# Patient Record
Sex: MALE | Race: WHITE | NOT HISPANIC OR LATINO | Employment: FULL TIME | URBAN - METROPOLITAN AREA
[De-identification: names, ages, dates, MRNs, and addresses within clinical notes are randomized per-mention and may not be internally consistent; named-entity substitution may affect disease eponyms.]

---

## 2020-06-25 ENCOUNTER — OFFICE VISIT (OUTPATIENT)
Dept: URGENT CARE | Facility: CLINIC | Age: 60
End: 2020-06-25
Payer: COMMERCIAL

## 2020-06-25 VITALS
OXYGEN SATURATION: 99 % | BODY MASS INDEX: 25.05 KG/M2 | HEIGHT: 70 IN | HEART RATE: 65 BPM | WEIGHT: 175 LBS | RESPIRATION RATE: 18 BRPM | TEMPERATURE: 98.3 F

## 2020-06-25 DIAGNOSIS — B34.9 VIRAL SYNDROME: Primary | ICD-10-CM

## 2020-06-25 PROCEDURE — 99203 OFFICE O/P NEW LOW 30 MIN: CPT | Performed by: PHYSICIAN ASSISTANT

## 2020-06-25 PROCEDURE — U0003 INFECTIOUS AGENT DETECTION BY NUCLEIC ACID (DNA OR RNA); SEVERE ACUTE RESPIRATORY SYNDROME CORONAVIRUS 2 (SARS-COV-2) (CORONAVIRUS DISEASE [COVID-19]), AMPLIFIED PROBE TECHNIQUE, MAKING USE OF HIGH THROUGHPUT TECHNOLOGIES AS DESCRIBED BY CMS-2020-01-R: HCPCS | Performed by: PHYSICIAN ASSISTANT

## 2020-06-26 ENCOUNTER — TELEPHONE (OUTPATIENT)
Dept: URGENT CARE | Facility: CLINIC | Age: 60
End: 2020-06-26

## 2020-06-27 LAB — SARS-COV-2 RNA SPEC QL NAA+PROBE: NOT DETECTED

## 2020-06-28 ENCOUNTER — TELEPHONE (OUTPATIENT)
Dept: URGENT CARE | Facility: MEDICAL CENTER | Age: 60
End: 2020-06-28

## 2021-07-13 ENCOUNTER — TELEPHONE (OUTPATIENT)
Dept: UROLOGY | Facility: MEDICAL CENTER | Age: 61
End: 2021-07-13

## 2021-07-13 NOTE — TELEPHONE ENCOUNTER
What is the reason for the patients appointment? Peggy Dyson from [address] [place] [address] [address] Internal Medicine calling for appointment for patient for right kidney atrophy  PCP will be faxing records over to [phone]    Do we accept the patient's insurance or is the patient Self-Pay? [employer]       Has the patient had any previous urologist(s)? no      Have patient records been requested? yes        Has the patient had any outside testing done?     No   What is the patients location preference for an office visit? [place]       Does the patient have a personal history of cancer?     No

## 2021-07-15 NOTE — TELEPHONE ENCOUNTER
Patient called stating he would like to know if records were received to make appointment   Patient is closer to [facility] but will come to [facility] if appointment available

## 2021-07-19 ENCOUNTER — OFFICE VISIT (OUTPATIENT)
Dept: UROLOGY | Facility: CLINIC | Age: 61
End: 2021-07-19
Payer: COMMERCIAL

## 2021-07-19 VITALS
WEIGHT: 182.98 LBS | DIASTOLIC BLOOD PRESSURE: 70 MMHG | HEIGHT: 70 IN | BODY MASS INDEX: 26.2 KG/M2 | SYSTOLIC BLOOD PRESSURE: 122 MMHG

## 2021-07-19 DIAGNOSIS — R97.20 RISING PSA LEVEL: ICD-10-CM

## 2021-07-19 DIAGNOSIS — Z80.42 FAMILY HISTORY OF PROSTATE CANCER: ICD-10-CM

## 2021-07-19 DIAGNOSIS — N20.0 RENAL CALCULUS: ICD-10-CM

## 2021-07-19 DIAGNOSIS — R10.9 RIGHT FLANK PAIN: Primary | ICD-10-CM

## 2021-07-19 DIAGNOSIS — N26.1 ATROPHIC KIDNEY: ICD-10-CM

## 2021-07-19 PROCEDURE — 99204 OFFICE O/P NEW MOD 45 MIN: CPT | Performed by: UROLOGY

## 2021-07-19 NOTE — PROGRESS NOTES
[address] 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  One renal ultrasound reports says he may have a 7 mm stone in the right kidney  This was described as nonobstructing  We decided upon getting a CT scan without contrast and I will send him the results  Right now there is no evidence of anything malignant and any further testing or therapy will be based purely upon his symptoms right now  Strong family history of prostate cancer, PSA up to 1 54  He will abstain from any sexual activity for 3 days prior to his next PSA and we will recheck this in 3 months and I will see him with that result  Chief Complaint     Chief Complaint   Patient presents with    Nephrolithiasis       History of Present Illness    new patient office visit:  70-year-old man sent in consultation for atrophic right kidney by Dr Nia Hough  He had a renal ultrasound done 2021 which showed marked right renal atrophy of the right kidney measuring 5 6 cm in maximal length  There was right pelviectasis as well as dilatation of the visualized portion of proximal aspect of the right ureter  PVR was 81 cc  Bladder looked normal   This was done for history of right renal atrophy and flank pain  The pain he describes has been going on for about 6 months on and off and is primarily a pinching sensation in his right upper quadrant right under the rib and then a dull ache in the right flank    He notices that he feels the sensations more the morning if PSA have a bowel movement and then the sensation decreases after he has the bowel movement  He has some urinary frequency and he has nocturia 1 time a night  No difficulty voiding  He was last seen by Dr Mil Leal U[facility] for urologic follow-up given that he had a strong family history of prostate cancer in 2 brothers, chronic prostatitis and right flank pain due to known atrophic kidney  PSA was 1 54 as of 5/24/2021  The prostatitis seem to be aggravated by bicycling  He may have had sexual activity within 3 days of his last PSA and this is a rise in his PSA from below 1 0  The following portions of the patient's history were reviewed and updated as appropriate: allergies, current medications, past family history, past medical history, past social history, past surgical history and problem list   Past Medical History:   Diagnosis Date    Known health problems: none      Past Surgical History:   Procedure Laterality Date    HERNIA REPAIR       shoulder  Review of Systems   Review of Systems   Constitutional: Negative for fever  Respiratory: Negative for shortness of breath  Cardiovascular: Negative for chest pain  Gastrointestinal: Negative  Genitourinary: Positive for frequency  Negative for difficulty urinating and hematuria  Active Problem List   There is no problem list on file for this patient  Objective   /70   Ht 5' 10" (1 778 m)   Wt 83 kg (182 lb 15 7 oz)   BMI 26 26 kg/m²     Physical Exam  Constitutional:       Appearance: Normal appearance  He is normal weight  HENT:      Head: Normocephalic and atraumatic  Eyes:      Extraocular Movements: Extraocular movements intact  Pulmonary:      Effort: Pulmonary effort is normal    Abdominal:      General: Abdomen is flat  There is no distension  Palpations: Abdomen is soft  There is no mass  Tenderness: There is no abdominal tenderness  There is no right CVA tenderness, left CVA tenderness, guarding or rebound  Hernia: No hernia is present  Genitourinary:     Penis: Normal        Testes: Normal       Prostate: Normal       Rectum: Normal       Comments: Normal circumcised phallus, testicles are descended bilaterally without lesions or nodules  Rectal exam reveals normal tone, no masses prostate about 30 g, smooth symmetric benign  No nodules  Musculoskeletal:         General: Normal range of motion  Cervical back: Normal range of motion  Skin:     Coloration: Skin is not jaundiced or pale  Neurological:      General: No focal deficit present  Mental Status: He is alert and oriented to person, place, and time  Psychiatric:         Mood and Affect: Mood normal          Behavior: Behavior normal          Thought Content: Thought content normal          Judgment: Judgment normal              Current Medications   No current outpatient medications on file          Sammie Osei MD Bladder tumor  01/30/2018    Active  Jayesh Harrison

## 2021-07-19 NOTE — LETTER
2021     Kezia Charles MD  [address] [facility] [address]    Patient: Hernan Dickinson   YOB: 1960   Date of Visit: 2021       Dear Dr Nicole Nesbitt: Thank you for referring Hernan Dickinson to me for evaluation  Below are my notes for this consultation  If you have questions, please do not hesitate to call me  I look forward to following your patient along with you  Sincerely,        Ivanna Maria MD        CC: No Recipients  Ivanna Maria MD  2021  3:23 PM  Sign when Signing Visit  [address] [facility]  [address], [address] [place] [phone]  www  [facility]  org      NAME: Hernan Dickinson  AGE: 62 y o  SEX: male  : 1960   MRN: [id number]    DATE: 2021  TIME: 3:16 PM    Assessment and Plan:    Right flank pain and right upper quadrant pain, associated with an atrophic kidney which is chronic 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  One renal ultrasound reports says he may have a 7 mm stone in the right kidney  This was described as nonobstructing  We decided upon getting a CT scan without contrast and I will send him the results  Right now there is no evidence of anything malignant and any further testing or therapy will be based purely upon his symptoms right now  Strong family history of prostate cancer, PSA up to 1 54  He will abstain from any sexual activity for 3 days prior to his next PSA and we will recheck this in 3 months and I will see him with that result  Chief Complaint     Chief Complaint   Patient presents with    Nephrolithiasis       History of Present Illness    new patient office visit:  46-year-old man sent in consultation for atrophic right kidney by Dr Nicole Nesbitt  He had a renal ultrasound done 2021 which showed marked right renal atrophy of the right kidney measuring 5 6 cm in maximal length    There was right pelviectasis as well as dilatation of the visualized portion of proximal aspect of the right ureter  PVR was 81 cc  Bladder looked normal   This was done for history of right renal atrophy and flank pain  The pain he describes has been going on for about 6 months on and off and is primarily a pinching sensation in his right upper quadrant right under the rib and then a dull ache in the right flank  He notices that he feels the sensations more the morning if PSA have a bowel movement and then the sensation decreases after he has the bowel movement  He has some urinary frequency and he has nocturia 1 time a night  No difficulty voiding  He was last seen by Dr Taiwo Messer Urology for urologic follow-up given that he had a strong family history of prostate cancer in 2 brothers, chronic prostatitis and right flank pain due to known atrophic kidney  PSA was 1 54 as of 5/24/2021  The prostatitis seem to be aggravated by bicycling  He may have had sexual activity within 3 days of his last PSA and this is a rise in his PSA from below 1 0  The following portions of the patient's history were reviewed and updated as appropriate: allergies, current medications, past family history, past medical history, past social history, past surgical history and problem list   Past Medical History:   Diagnosis Date    Known health problems: none      Past Surgical History:   Procedure Laterality Date    HERNIA REPAIR       shoulder  Review of Systems   Review of Systems   Constitutional: Negative for fever  Respiratory: Negative for shortness of breath  Cardiovascular: Negative for chest pain  Gastrointestinal: Negative  Genitourinary: Positive for frequency  Negative for difficulty urinating and hematuria  Active Problem List   There is no problem list on file for this patient        Objective   /70   Ht 5' 10" (1 778 m)   Wt 83 kg (182 lb 15 7 oz)   BMI 26 26 kg/m²     Physical Exam  Constitutional:       Appearance: Normal appearance  He is normal weight  HENT:      Head: Normocephalic and atraumatic  Eyes:      Extraocular Movements: Extraocular movements intact  Pulmonary:      Effort: Pulmonary effort is normal    Abdominal:      General: Abdomen is flat  There is no distension  Palpations: Abdomen is soft  There is no mass  Tenderness: There is no abdominal tenderness  There is no right CVA tenderness, left CVA tenderness, guarding or rebound  Hernia: No hernia is present  Genitourinary:     Penis: Normal        Testes: Normal       Prostate: Normal       Rectum: Normal       Comments: Normal circumcised phallus, testicles are descended bilaterally without lesions or nodules  Rectal exam reveals normal tone, no masses prostate about 30 g, smooth symmetric benign  No nodules  Musculoskeletal:         General: Normal range of motion  Cervical back: Normal range of motion  Skin:     Coloration: Skin is not jaundiced or pale  Neurological:      General: No focal deficit present  Mental Status: He is alert and oriented to person, place, and time  Psychiatric:         Mood and Affect: Mood normal          Behavior: Behavior normal          Thought Content: Thought content normal          Judgment: Judgment normal              Current Medications   No current outpatient medications on file          Be Gomez MD

## 2021-07-23 ENCOUNTER — HOSPITAL ENCOUNTER (OUTPATIENT)
Dept: CT IMAGING | Facility: HOSPITAL | Age: 61
Discharge: HOME | End: 2021-07-23
Attending: UROLOGY
Payer: COMMERCIAL

## 2021-07-23 DIAGNOSIS — N26.1 ATROPHIC KIDNEY: ICD-10-CM

## 2021-07-23 DIAGNOSIS — R10.9 RIGHT FLANK PAIN: ICD-10-CM

## 2021-07-23 DIAGNOSIS — N20.0 RENAL CALCULUS: ICD-10-CM

## 2021-07-23 PROCEDURE — 74176 CT ABD & PELVIS W/O CONTRAST: CPT

## 2021-07-23 PROCEDURE — G1004 CDSM NDSC: HCPCS

## 2021-10-14 ENCOUNTER — TELEPHONE (OUTPATIENT)
Dept: UROLOGY | Facility: CLINIC | Age: 61
End: 2021-10-14

## 2021-10-26 ENCOUNTER — NURSE TRIAGE (OUTPATIENT)
Dept: OTHER | Facility: OTHER | Age: 61
End: 2021-10-26

## 2021-10-26 DIAGNOSIS — Z20.822 ENCOUNTER FOR SCREENING LABORATORY TESTING FOR COVID-19 VIRUS: Primary | ICD-10-CM

## 2021-10-26 PROCEDURE — U0003 INFECTIOUS AGENT DETECTION BY NUCLEIC ACID (DNA OR RNA); SEVERE ACUTE RESPIRATORY SYNDROME CORONAVIRUS 2 (SARS-COV-2) (CORONAVIRUS DISEASE [COVID-19]), AMPLIFIED PROBE TECHNIQUE, MAKING USE OF HIGH THROUGHPUT TECHNOLOGIES AS DESCRIBED BY CMS-2020-01-R: HCPCS | Performed by: FAMILY MEDICINE

## 2021-10-26 PROCEDURE — U0005 INFEC AGEN DETEC AMPLI PROBE: HCPCS | Performed by: FAMILY MEDICINE

## 2022-03-22 ENCOUNTER — TELEPHONE (OUTPATIENT)
Dept: UROLOGY | Facility: CLINIC | Age: 62
End: 2022-03-22

## 2022-03-22 NOTE — TELEPHONE ENCOUNTER
----- Message from Awilda Andres MD sent at 3/22/2022 12:09 PM EDT -----  Please let him know that his PSA dropped to 0 68  This is very good

## 2022-05-09 VITALS
HEART RATE: 59 BPM | HEIGHT: 70 IN | SYSTOLIC BLOOD PRESSURE: 110 MMHG | WEIGHT: 187 LBS | DIASTOLIC BLOOD PRESSURE: 66 MMHG | TEMPERATURE: 96.4 F | BODY MASS INDEX: 26.77 KG/M2

## 2022-05-09 DIAGNOSIS — M25.572 ACUTE LEFT ANKLE PAIN: Primary | ICD-10-CM

## 2022-05-09 DIAGNOSIS — M79.672 LEFT FOOT PAIN: ICD-10-CM

## 2022-05-09 PROCEDURE — 99204 OFFICE O/P NEW MOD 45 MIN: CPT | Performed by: ORTHOPAEDIC SURGERY

## 2022-05-09 RX ORDER — PROMETHAZINE HYDROCHLORIDE 25 MG/1
TABLET ORAL DAILY
COMMUNITY

## 2022-05-09 NOTE — PROGRESS NOTES
ASSESSMENT/PLAN:    Diagnoses and all orders for this visit:    Acute left ankle pain  -     Ankle Cude ankle/Ankle Brace    Left foot pain    Other orders  -     Multiple Vitamins-Iron TABS; Take by mouth daily        Plan:  I discussed his treatment options  At this time, he did not want to consider physical therapy but was given some instructions on a home exercise program   He was provided with an ankle brace  This should be worn whenever he is active but may be removed for sleeping and cleansing  He is to contact me if questions or concerns arise prior to follow-up in 2 weeks  If symptoms do not seem to be improving to a greater degree, a formal course of physical therapy might be of benefit  Return in about 2 weeks (around 5/23/2022)  _____________________________________________________  CHIEF COMPLAINT:  Chief Complaint   Patient presents with    Left Ankle - Pain, Dislocation         SUBJECTIVE:  Melany Boxer is a 65y o  year old male who presents for evaluation of his left ankle injured on 04/30/2022  He states he was walking when he suffered an inversion type injury noted that the foot looked dislocated and he relocated it  He noted minimal swelling but significant pain at that time  Over the past 9 days the pain has significantly improved but he does continue to experience pain along the dorsal lateral aspect of the foot radiating to the lateral ankle and the lateral aspect of the lower leg  He denies paresthesias  He has had previous ankle injuries but he believes it has been at least a year or more since his last left ankle injury  He did not seek immediate medical care, but was seen by his primary care physician and x-rays were obtained  on 05/06/2022  He was subsequently referred for orthopedic consultation and treatment      PAST MEDICAL HISTORY:  Past Medical History:   Diagnosis Date    Known health problems: none        PAST SURGICAL HISTORY:  Past Surgical History: Procedure Laterality Date    HERNIA REPAIR         FAMILY HISTORY:  History reviewed  No pertinent family history  SOCIAL HISTORY:  Social History     Tobacco Use    Smoking status: Never Smoker    Smokeless tobacco: Never Used   Vaping Use    Vaping Use: Never used   Substance Use Topics    Alcohol use: Yes     Alcohol/week: 1 0 standard drink     Types: 1 Cans of beer per week    Drug use: Never       MEDICATIONS:    Current Outpatient Medications:     Multiple Vitamins-Iron TABS, Take by mouth daily, Disp: , Rfl:     ALLERGIES:  No Known Allergies    Review of systems:   Constitutional: Negative for fatigue, fever or loss of apetite  HENT: Negative  Respiratory: Negative for shortness of breath, dyspnea  Cardiovascular: Negative for chest pain/tightness  Gastrointestinal: Negative for abdominal pain, N/V  Endocrine: Negative for cold/heat intolerance, unexplained weight loss/gain  Genitourinary: Negative for flank pain, dysuria, hematuria  Musculoskeletal:  Positive as in the HPI   Skin: Negative for rash  Neurological:  Negative  Psychiatric/Behavioral: Negative for agitation  _____________________________________________________  PHYSICAL EXAMINATION:    Blood pressure 110/66, pulse 59, temperature (!) 96 4 °F (35 8 °C), temperature source Temporal, height 5' 10" (1 778 m), weight 84 8 kg (187 lb)  General: well developed and well nourished, alert, oriented times 3 and appears comfortable  Psychiatric: Normal  HEENT: Benign  Cardiovascular: Regular    Pulmonary: No wheezing or stridor  Abdomen: Soft, Nontender  Skin: No masses, erythema, lacerations, fluctation, ulcerations  Neurovascular: Motor and sensory exams are intact and pulses palpable  MUSCULOSKELETAL EXAMINATION:    The left ankle exam demonstrates no significant swelling  Skin is warm and dry  There is no visible deformity  He has good subtalar and forefoot motion without complaints    Ankle can be dorsiflexed 20° and plantar flexed 30° with complaints of mild pain during plantar flexion  He has a negative syndesmotic and anterior draw testing  Lateral ankle ligaments were minimally tender primarily over the anterior talofibular ligament with no tenderness over the posterior talofibular ligament or calcaneofibular ligament  The deltoid ligament was minimally tender  The distal tibia and fibula nontender  He did complain of some tenderness over the extensor digitorum brevis muscle belly and over the 3rd and 4th metatarsal shaft  The 1st and 2nd metatarsals were nontender and the 5th metatarsal nontender  Plantar fascia is nontender  He does have some peroneal tendon subluxation and mild crepitus noted during active circumduction of the ankle but denied pain  When standing, he has a subtle degree of pes planovalgus with the lateral 2 toes being visible from behind  He is able to stand up on his toes independently with restoration of the arch       _____________________________________________________  STUDIES REVIEWED:  X-rays of his ankle obtained 05/06/2022 demonstrated no evidence of bony injury  There is an accessory ossicle near the distal portion of the medial malleolus          Leyla De Los Santos

## 2022-05-12 ENCOUNTER — OFFICE VISIT (OUTPATIENT)
Dept: DERMATOLOGY | Facility: CLINIC | Age: 62
End: 2022-05-12
Payer: COMMERCIAL

## 2022-05-12 VITALS — WEIGHT: 188 LBS | TEMPERATURE: 96.1 F | HEIGHT: 70 IN | BODY MASS INDEX: 26.92 KG/M2

## 2022-05-12 DIAGNOSIS — D22.5 MULTIPLE BENIGN MELANOCYTIC NEVI OF UPPER AND LOWER EXTREMITIES AND TRUNK: ICD-10-CM

## 2022-05-12 DIAGNOSIS — L21.9 SEBORRHEIC DERMATITIS: ICD-10-CM

## 2022-05-12 DIAGNOSIS — L57.0 ACTINIC KERATOSIS: Primary | ICD-10-CM

## 2022-05-12 DIAGNOSIS — D22.70 MULTIPLE BENIGN MELANOCYTIC NEVI OF UPPER AND LOWER EXTREMITIES AND TRUNK: ICD-10-CM

## 2022-05-12 DIAGNOSIS — L82.1 SEBORRHEIC KERATOSIS: ICD-10-CM

## 2022-05-12 DIAGNOSIS — D22.60 MULTIPLE BENIGN MELANOCYTIC NEVI OF UPPER AND LOWER EXTREMITIES AND TRUNK: ICD-10-CM

## 2022-05-12 DIAGNOSIS — D18.01 CHERRY ANGIOMA: ICD-10-CM

## 2022-05-12 PROCEDURE — 99203 OFFICE O/P NEW LOW 30 MIN: CPT | Performed by: DERMATOLOGY

## 2022-05-12 PROCEDURE — 17000 DESTRUCT PREMALG LESION: CPT | Performed by: DERMATOLOGY

## 2022-05-12 PROCEDURE — 17003 DESTRUCT PREMALG LES 2-14: CPT | Performed by: DERMATOLOGY

## 2022-05-12 RX ORDER — KETOCONAZOLE 20 MG/G
CREAM TOPICAL
Qty: 60 G | Refills: 2 | Status: SHIPPED | OUTPATIENT
Start: 2022-05-12

## 2022-05-12 NOTE — PATIENT INSTRUCTIONS
SEBORRHEIC KERATOSIS; NON-INFLAMED    Assessment and Plan:  Based on a thorough discussion of this condition and the management approach to it (including a comprehensive discussion of the known risks, side effects and potential benefits of treatment), the patient (family) agrees to implement the following specific plan:  Reassured benign  SPF 48 + and reapply every 2 hours    Seborrheic Keratosis  A seborrheic keratosis is a harmless warty spot that appears during adult life as a common sign of skin aging  Seborrheic keratoses can arise on any area of skin, covered or uncovered, with the usual exception of the palms and soles  They do not arise from mucous membranes  Seborrheic keratoses can have highly variable appearance  Seborrheic keratoses are extremely common  It has been estimated that over 90% of adults over the age of 61 years have one or more of them  They occur in males and females of all races, typically beginning to erupt in the 35s or 45s  They are uncommon under the age of 21 years  The precise cause of seborrhoeic keratoses is not known  Seborrhoeic keratoses are considered degenerative in nature  As time goes by, seborrheic keratoses tend to become more numerous  Some people inherit a tendency to develop a very large number of them; some people may have hundreds of them  The name "seborrheic keratosis" is misleading, because these lesions are not limited to a seborrhoeic distribution (scalp, mid-face, chest, upper back), nor are they formed from sebaceous glands, nor are they associated with sebum -- which is greasy    Seborrheic keratosis may also be called "SK," "Seb K," "basal cell papilloma," "senile wart," or "barnacle "      Researchers have noted:  Eruptive seborrhoeic keratoses can follow sunburn or dermatitis  Skin friction may be the reason they appear in body folds  Viral cause (e g , human papillomavirus) seems unlikely  Stable and clonal mutations or activation of FRFR3, PIK3CA, BELLA, AKT1 and EGFR genes are found in seborrhoeic keratoses  Seborrhoeic keratosis can arise from solar lentigo  FRFR3 mutations also arise in solar lentigines  These mutations are associated with increased age and location on the head and neck, suggesting a role of ultraviolet radiation in these lesions  Seborrheic keratoses do not harbour tumour suppressor gene mutations  Epidermal growth factor receptor inhibitors, which are used to treat some cancers, often result in an increase in verrucal (warty) keratoses  There is no easy way to remove multiple lesions on a single occasion  Unless a specific lesion is "inflamed" and is causing pain or stinging/burning or is bleeding, most insurance companies do not authorize treatment  NICOLAS ANGIOMAS    Assessment and Plan:  Based on a thorough discussion of this condition and the management approach to it (including a comprehensive discussion of the known risks, side effects and potential benefits of treatment), the patient (family) agrees to implement the following specific plan:  Reassured benign    Assessment and Plan:    Cherry angioma, also known as Radha  spots, are benign vascular skin lesions  A "cherry angioma" is a firm red, blue or purple papule, 0 1-1 cm in diameter  When thrombosed, they can appear black in colour until evaluated with a dermatoscope when the red or purple colour is more easily seen  Cherry angioma may develop on any part of the body but most often appear on the scalp, face, lips and trunk  An angioma is due to proliferating endothelial cells; these are the cells that line the inside of a blood vessel  Angiomas can arise in early life or later in life; the most common type of angioma is a cherry angioma  Cherry angiomas are very common in males and females of any age or race  They are more noticeable in white skin than in skin of colour  They markedly increase in number from about the age of 36   There may be a family history of similar lesions  Eruptive cherry angiomas have been rarely reported to be associated with internal malignancy  The cause of angiomas is unknown  Genetic analysis of cherry angiomas has shown that they frequently carry specific somatic missense mutations in the GNAQ and GNA11 (Q209H) genes, which are involved in other vascular and melanocytic proliferations  Cherry angioma is usually diagnosed clinically and no investigations are necessary for the majority of lesions  It has a characteristic red-clod or lobular pattern on dermatoscopy (called lacunar pattern using conventional pattern analysis)  When there is uncertainty about the diagnosis, a biopsy may be performed  The angioma is composed of venules in a thickened papillary dermis  Collagen bundles may be prominent between the lobules  Cherry angiomas are harmless, so they do not usually have to be treated  Occasionally, they are removed to exclude a malignant skin lesion such as a nodular melanoma or because they are irritated or bleeding (and a subsequent risk for infection)  To decrease friction over the lesions, we recommend Neutrogena Daily Defense SPF 50+ at least 3 times a day  ACTINIC KERATOSIS  Assessment and Plan:  Based on a thorough discussion of this condition and the management approach to it (including a comprehensive discussion of the known risks, side effects and potential benefits of treatment), the patient (family) agrees to implement the following specific plan:    Dicussed treatment options of topicals  Liquid nitrogen was applied for 10-12 seconds to the skin lesion and the expected blistering or scabbing reaction explained  Do not pick at the area  Patient reminded to expect hypopigmented scars from the procedure  Return if lesion fails to fully resolve    Follow up in 4-6 months      Actinic keratoses are very common on sites repeatedly exposed to the sun, especially the backs of the hands and the face, most often affecting the ears, nose, cheeks, upper lip, vermilion of the lower lip, temples, forehead and balding scalp  In severely chronically sun-damaged individuals, they may also be found on the upper trunk, upper and lower limbs, and dorsum of feet  We discussed the theoretical premalignant (pre-cancerous) nature and etiology of these growths  We discussed the prevailing notion that actinic keratoses are a reflection of abnormal skin cell development due to DNA damage by short wavelength UVB  They are more likely to appear if the immune function is poor, due to aging, recent sun exposure, predisposing disease or certain drugs  We discussed that the main concern is that actinic keratoses may predispose to squamous cell carcinoma  It is rare for a solitary actinic keratosis to evolve to squamous cell carcinoma (SCC), but the risk of SCC occurring at some stage in a patient with more than 10 actinic keratoses is thought to be about 10 to 15%  A tender, thickened, ulcerated or enlarging actinic keratosis is suspicious of SCC  Actinic keratoses may be prevented by strict sun protection  If already present, keratoses may improve with a very high sun protection factor (50+) broad-spectrum sunscreen applied at least daily to affected areas, year-round  We recommend that UPF-rated clothing and hats and sunglasses be worn whenever possible and that a sunscreen-moisturizer combination product such as Neutrogena Daily Defense be applied at least three times a day      We performed a thorough discussion of treatment options and specific risk/benefits/alternatives including but not limited to medical field treatment with medications such as the following:    Topical field area medications such as 5-fluorouracil or Aldara (specifically, the trouble with long-term compliance, blistering and local skin reaction versus the convenience of at-home therapy and that field therapy gets what is not yet seen)  Cryotherapy (specifically, local pain, scarring, dyspigmentation, blistering, possible superinfection, and treats only what we see versus directed treatment today)  Photodynamic therapy (specifically, local pain, scarring, dyspigmentation, blistering, possible superinfection, need to schedule for a later date, and time spent in the office versus field therapy that gets what is not yet seen)  MELANOCYTIC NEVI ("Moles")    Assessment and Plan:  Based on a thorough discussion of this condition and the management approach to it (including a comprehensive discussion of the known risks, side effects and potential benefits of treatment), the patient (family) agrees to implement the following specific plan:  Reassured benign     Melanocytic Nevi  Melanocytic nevi ("moles") are caused by collections of the color producing skin cells, or melanocytes, in 1 area in the skin  They can range in color from pink to dark brown and be either raised or flat  Some moles are present at birth (I e , "congenital nevi"), while others come up later in life (i e , "acquired nevi")  Sarbjit Mike exposure also stimulates the body to make more moles, ie the more sun you get the more moles you'll grow  Clinically distinguishing a healthy mole from melanoma may be difficult  The "ABCDE's" of moles have been suggested as a means of helping to alert a person to a suspicious mole and the possible increased risk of melanoma  Asymmetry: Healthy moles tend to be symmetric, while melanomas are often asymmetric  Asymmetry means if you draw a line through the mole, the two halves do not match in color, size, shape, or surface texture Any mole that starts to demonstrate "asymmetry" should be examined promptly by a board certified dermatologist      Border: Healthy moles tend to have discrete, even borders  The border of a melanoma often blends into the normal skin and does not sharply delineate the mole from normal skin    Any mole that starts to demonstrate "uneven borders" should be examined promptly     Color: Healthy moles tend to be one color throughout  Melanomas tend to be made up of different colors ranging from dark black, blue, white, or red  Any mole that demonstrates a color change should be examined promptly    Diameter: Healthy moles tend to be smaller than 0 6 cm in size; an exception are "congenital nevi" that can be larger  Melanomas tend to grow and can often be greater than 0 6 cm (1/4 of an inch, or the size of a pencil eraser)  This is only a guideline, and many normal moles may be larger than 0 6 cm without being unhealthy  Any mole that starts to change in size (small to bigger or bigger to smaller) should be examined promptly    Evolving: Healthy moles tend to "stay the same "  Melanomas may often show signs of change or evolution such as a change in size, shape, color, or elevation  Any mole that starts to itch, bleed, crust, burn, hurt, or ulcerate or demonstrate a change or evolution should be examined promptly by a board certified dermatologist       What are atypical moles or dysplastic nevi? Dysplastic moles are moles that have some of the ABCDE  changes listed above but  are not cancerous  Sometimes a biopsy and microscopic examination are needed to determine the difference  They may indicate an increased risk of melanoma in that person, especially if there is a family history of melanoma  What is a Melanoma? The main concern when looking at a new or changing mole it to evaluate whether it may be a melanoma  The appearance of a "new mole" remains one of the most reliable methods for identifying a malignant melanoma  A melanoma is a type of skin cancer that can be deadly if it spreads throughout the body  The prognosis of a Melanoma depends on how deep it has penetrated in the skin    If caught early, they generally will not have had time to grow into the deeper layers of the skin and they cure rate is then very high  Once the melanoma grows deeper into the skin, the cure rate drops dramatically  Therefore, early detection and removal of a malignant melanoma results in a much better chance of complete cure  SEBORRHEIC DERMATITIS      Assessment and Plan:  Based on a thorough discussion of this condition and the management approach to it (including a comprehensive discussion of the known risks, side effects and potential benefits of treatment), the patient (family) agrees to implement the following specific plan:  Ketoconazole cream apply to Forehead, cheeks twice daily for 1 month        Seborrheic Dermatitis   Seborrheic dermatitis is a common, chronic or relapsing form of eczema/dermatitis that mainly affects the sebaceous, gland-rich regions of the scalp, face, and trunk  There are infantile and adult forms of seborrhoeic dermatitis  It is sometimes associated with psoriasis and, in that clinical scenario, may be referred to as "sebo-psoriasis "  Seborrheic dermatitis is also known as "seborrheic eczema "  Dandruff (also called "pityriasis capitis") is an uninflamed form of seborrhoeic dermatitis  Dandruff presents as bran-like scaly patches scattered within hair-bearing areas of the scalp  In an infant, this condition may be referred to as "cradle cap "  The cause of seborrheic dermatitis is not completely understood  It is associated with proliferation of various species of the skin commensal Malassezia, in its yeast (non-pathogenic) form  Its metabolites (such as the fatty acids oleic acid, malssezin, and indole-3-carbaldehyde) may cause an inflammatory reaction  Differences in skin barrier lipid content and function may account for individual presentations  Infantile Seborrheic Dermatitis  Infantile seborrheic dermatitis affects babies under the age of 1 months and usually resolves by 1012 months of age    Infantile seborrheic dermatitis causes "cradle cap" (diffuse, greasy scaling on scalp)  The rash may spread to affect armpit and groin folds (a type of "napkin dermatitis")  There may be associated salmon-pink colored patches that may flake or peel  The rash in this case is usually not especially itchy, so the baby often appears undisturbed by the rash, even when more generalized  Adult Seborrheic Dermatitis  Adult seborrheic dermatitis tends to begin in late adolescence; prevalence is greatest in young adults and in the elderly  It is more common in males than in females  The following factors are sometimes associated with severe adult seborrheic dermatitis:  Oily skin  Familial tendency to seborrhoeic dermatitis or a family history of psoriasis  Immunosuppression: organ transplant recipient, human immunodeficiency virus (HIV) infection and patients with lymphoma  Neurological and psychiatric diseases: Parkinson disease, tardive dyskinesia, depression, epilepsy, facial nerve palsy, spinal cord injury and congenital disorders such as Down syndrome  Treatment for psoriasis with psoralen and ultraviolet A (PUVA) therapy  Lack of sleep  Stressful events  In adults, seborrheic dermatitis may typically affect the scalp, face (creases around the nose, behind ears, within eyebrows) and upper trunk  Typical clinical features include: Winter flares, improving in summer following sun exposure  Minimal itch most of the time  Combination oily and dry mid-facial skin  Ill-defined localized scaly patches or diffuse scale in the scalp  Blepharitis; scaly red eyelid margins  Flushing-pink, thin, scaly, and ill-defined plaques in skin folds on both sides of the face  Petal or ring-shaped flaky patches on hair-line and on anterior chest  Rash in armpits, under the breasts, in the groin folds and genital creases  Superficial folliculitis (inflamed hair follicles) on cheeks and upper trunk    Seborrheic dermatitis is diagnosed by its clinical appearance and behavior   Skin biopsy may be helpful but is rarely necessary to make this diagnosis

## 2022-05-12 NOTE — PROGRESS NOTES
[facility] Note     Patient Name: Britta Robb  Encounter Date: 0512/22     Have you been cared for by a [facility] in the last 3 years and, if so, which one? No    · Have you traveled outside of the [address] [place] [address] in the past 3 months or outside of the [facility] area in the last 2 weeks? No     May we call your Preferred Phone number to discuss your specific medical information? Yes     May we leave a detailed message that includes your specific medical information? Yes      Today's Chief Concerns:   Concern #1:  Mole on neck      Past Medical History:  Have you personally ever had or currently have any of the following? · Skin cancer (such as Melanoma, Basal Cell Carcinoma, Squamous Cell Carcinoma? (If Yes, please provide more detail)- No  · Eczema: No  · Psoriasis: No  · HIV/AIDS: No  · Hepatitis B or C: No  · Tuberculosis: No  · Systemic Immunosuppression such as Diabetes, Biologic or Immunotherapy, Chemotherapy, Organ Transplantation, Bone Marrow Transplantation (If YES, please provide more detail): No  · Radiation Treatment (If YES, please provide more detail): No  · Any other major medical conditions/concerns? (If Yes, which types)- No    Social History:     What is/was your primary occupation?      What are your hobbies/past-times? Family History:  Have any of your "first degree relatives" (parent, brother, sister, or child) had any of the following       · Skin cancer such as Melanoma or Merkel Cell Carcinoma or Pancreatic Cancer? No  · Eczema, Asthma, Hay Fever or Seasonal Allergies: No  · Psoriasis or Psoriatic Arthritis: No  · Do any other medical conditions seem to run in your family? If Yes, what condition and which relatives?   YES, mother had hx of hypertension, brothers have hx of caner    Current Medications:         Current Outpatient Medications:     Multiple Vitamins-Iron TABS, Take by mouth daily, Disp: , Rfl:       Review of Systems:  Have you recently had or currently have any of the following? If YES, what are you doing for the problem? · Fever, chills or unintended weight loss: No  · Sudden loss or change in your vision: No  · Nausea, vomiting or blood in your stool: No  · Painful or swollen joints: No  · Wheezing or cough: No  · Changing mole or non-healing wound: YES, neck  · Nosebleeds: No  · Excessive sweating: No  · Easy or prolonged bleeding? No  · Over the last 2 weeks, how often have you been bothered by the following problems? · Taking little interest or pleasure in doing things: 1 - Not at All  · Feeling down, depressed, or hopeless: 1 - Not at All  · Rapid heartbeat with epinephrine:  No        · No Known Allergies      Physical Exam:     Was a chaperone (Derm Clinical Assistant) present throughout the entire Physical Exam? Yes     Did the Dermatology Team specifically  the patient on the importance of a Full Skin Exam to be sure that nothing is missed clinically?  Yes}  o Did the patient ultimately request or accept a Full Skin Exam?  NO  o Did the patient specifically refuse to have the areas "under-the-bra" examined by the Dermatologist? No  o Did the patient specifically refuse to have the areas "under-the-underwear" examined by the Dermatologist? No    CONSTITUTIONAL:   Vitals:    05/12/22 0840   Temp: (!) 96 1 °F (35 6 °C)   TempSrc: Temporal   Weight: 85 3 kg (188 lb)   Height: 5' 10" (1 778 m)       PSYCH: Normal mood and affect  EYES: Normal conjunctiva  ENT: Normal lips and oral mucosa  CARDIOVASCULAR: No edema  RESPIRATORY: Normal respirations  HEME/LYMPH/IMMUNO:  No regional lymphadenopathy except as noted below in "ASSESSMENT AND PLAN BY DIAGNOSIS"    SKIN:  FULL ORGAN SYSTEM EXAM   Hair, Scalp, Ears, Face Normal except as noted below in Assessment   Neck, Cervical Chain Nodes Normal except as noted below in Assessment   Right Arm/Hand/Fingers Normal except as noted below in Assessment   Left Arm/Hand/Fingers Normal except as noted below in Assessment   Chest/Breasts/Axillae Viewed areas Normal except as noted below in Assessment   Abdomen, Umbilicus Normal except as noted below in Assessment   Back/Spine Normal except as noted below in Assessment   Groin/Genitalia/Buttocks NOT EXAMINED   Right Leg, Foot, Toes Normal except as noted below in Assessment   Left Leg, Foot, Toes Normal except as noted below in Assessment        Assessment and Plan by Diagnosis:    History of Present Condition:     Duration:  How long has this been an issue for you?    o  about 1 week ago-neck,  Not sure- abdomen   Location Affected:  Where on the body is this affecting you?    o  right side of neck, abdomen   Quality:  Is there any bleeding, pain, itch, burning/irritation, or redness associated with the skin lesion? o  denies-neck, itches, abdomen   Severity:  Describe any bleeding, pain, itch, burning/irritation, or redness on a scale of 1 to 10 (with 10 being the worst)  o  0, 2   Timing:  Does this condition seem to be there pretty constantly or do you notice it more at specific times throughout the day? o  constant   Context:  Have you ever noticed that this condition seems to be associated with specific activities you do?    o  denies   Modifying Factors:    o Anything that seems to make the condition worse?    -  denies  o What have you tried to do to make the condition better?    -  denies   Associated Signs and Symptoms:  Does this skin lesion seem to be associated with any of the following:  o  SL AMB DERM SIGNS AND SYMPTOMS: Itching and Scratching       SEBORRHEIC KERATOSIS; NON-INFLAMED    Physical Exam:   Anatomic Location Affected:  Right neck   Morphological Description:  Flat and raised, waxy, smooth to warty textured, yellow to brownish-grey to dark brown to blackish, discrete, "stuck-on" appearing papules     Pertinent Positives:   Pertinent Negatives: Additional History of Present Condition:  Patient reports new bumps on the skin  Denies itch, burn, pain, bleeding or ulceration  Present constantly; nothing seems to make it worse or better  No prior treatment  Assessment and Plan:  Based on a thorough discussion of this condition and the management approach to it (including a comprehensive discussion of the known risks, side effects and potential benefits of treatment), the patient (family) agrees to implement the following specific plan:   Reassured benign   SPF 48 + and reapply every 2 hours    Seborrheic Keratosis  A seborrheic keratosis is a harmless warty spot that appears during adult life as a common sign of skin aging  Seborrheic keratoses can arise on any area of skin, covered or uncovered, with the usual exception of the palms and soles  They do not arise from mucous membranes  Seborrheic keratoses can have highly variable appearance  Seborrheic keratoses are extremely common  It has been estimated that over 90% of adults over the age of 61 years have one or more of them  They occur in males and females of all races, typically beginning to erupt in the 35s or 45s  They are uncommon under the age of 21 years  The precise cause of seborrhoeic keratoses is not known  Seborrhoeic keratoses are considered degenerative in nature  As time goes by, seborrheic keratoses tend to become more numerous  Some people inherit a tendency to develop a very large number of them; some people may have hundreds of them  The name "seborrheic keratosis" is misleading, because these lesions are not limited to a seborrhoeic distribution (scalp, mid-face, chest, upper back), nor are they formed from sebaceous glands, nor are they associated with sebum -- which is greasy    Seborrheic keratosis may also be called "SK," "Seb K," "basal cell papilloma," "senile wart," or "barnacle "      Researchers have noted:   Eruptive seborrhoeic keratoses can follow sunburn or dermatitis   Skin friction may be the reason they appear in body folds   Viral cause (e g , human papillomavirus) seems unlikely   Stable and clonal mutations or activation of FRFR3, PIK3CA, BELLA, AKT1 and EGFR genes are found in seborrhoeic keratoses   Seborrhoeic keratosis can arise from solar lentigo   FRFR3 mutations also arise in solar lentigines  These mutations are associated with increased age and location on the head and neck, suggesting a role of ultraviolet radiation in these lesions   Seborrheic keratoses do not harbour tumour suppressor gene mutations   Epidermal growth factor receptor inhibitors, which are used to treat some cancers, often result in an increase in verrucal (warty) keratoses  There is no easy way to remove multiple lesions on a single occasion  Unless a specific lesion is "inflamed" and is causing pain or stinging/burning or is bleeding, most insurance companies do not authorize treatment  NICOLAS ANGIOMAS    Physical Exam:   Anatomic Location Affected:  Trunk and extemities   Morphological Description:  Scattered cherry red, 1-4 mm papules   Pertinent Positives:   Pertinent Negatives: Additional History of Present Condition:  Present for years    Assessment and Plan:  Based on a thorough discussion of this condition and the management approach to it (including a comprehensive discussion of the known risks, side effects and potential benefits of treatment), the patient (family) agrees to implement the following specific plan:   Reassured benign    Assessment and Plan:    Cherry angioma, also known as Orvil Rule spots, are benign vascular skin lesions  A "cherry angioma" is a firm red, blue or purple papule, 0 1-1 cm in diameter  When thrombosed, they can appear black in colour until evaluated with a dermatoscope when the red or purple colour is more easily seen   Cherry angioma may develop on any part of the body but most often appear on the scalp, face, lips and trunk  An angioma is due to proliferating endothelial cells; these are the cells that line the inside of a blood vessel  Angiomas can arise in early life or later in life; the most common type of angioma is a cherry angioma  Cherry angiomas are very common in males and females of any age or race  They are more noticeable in white skin than in skin of colour  They markedly increase in number from about the age of 36  There may be a family history of similar lesions  Eruptive cherry angiomas have been rarely reported to be associated with internal malignancy  The cause of angiomas is unknown  Genetic analysis of cherry angiomas has shown that they frequently carry specific somatic missense mutations in the GNAQ and GNA11 (Q209H) genes, which are involved in other vascular and melanocytic proliferations  Cherry angioma is usually diagnosed clinically and no investigations are necessary for the majority of lesions  It has a characteristic red-clod or lobular pattern on dermatoscopy (called lacunar pattern using conventional pattern analysis)  When there is uncertainty about the diagnosis, a biopsy may be performed  The angioma is composed of venules in a thickened papillary dermis  Collagen bundles may be prominent between the lobules  Cherry angiomas are harmless, so they do not usually have to be treated  Occasionally, they are removed to exclude a malignant skin lesion such as a nodular melanoma or because they are irritated or bleeding (and a subsequent risk for infection)  To decrease friction over the lesions, we recommend Neutrogena Daily Defense SPF 50+ at least 3 times a day      ACTINIC KERATOSIS    Physical Exam:   Anatomic Location Affected:  face   Morphological Description:  Light pink scaly macules    Additional History of Present Condition:  Discovered upon skin exam    Assessment and Plan:  Based on a thorough discussion of this condition and the management approach to it (including a comprehensive discussion of the known risks, side effects and potential benefits of treatment), the patient (family) agrees to implement the following specific plan:     Dicussed treatment options of topicals   Liquid nitrogen was applied for 10-12 seconds to the skin lesion and the expected blistering or scabbing reaction explained  Do not pick at the area  Patient reminded to expect hypopigmented scars from the procedure  Return if lesion fails to fully resolve   Follow up in 4-6 months      Actinic keratoses are very common on sites repeatedly exposed to the sun, especially the backs of the hands and the face, most often affecting the ears, nose, cheeks, upper lip, vermilion of the lower lip, temples, forehead and balding scalp  In severely chronically sun-damaged individuals, they may also be found on the upper trunk, upper and lower limbs, and dorsum of feet  We discussed the theoretical premalignant (pre-cancerous) nature and etiology of these growths  We discussed the prevailing notion that actinic keratoses are a reflection of abnormal skin cell development due to DNA damage by short wavelength UVB  They are more likely to appear if the immune function is poor, due to aging, recent sun exposure, predisposing disease or certain drugs  We discussed that the main concern is that actinic keratoses may predispose to squamous cell carcinoma  It is rare for a solitary actinic keratosis to evolve to squamous cell carcinoma (SCC), but the risk of SCC occurring at some stage in a patient with more than 10 actinic keratoses is thought to be about 10 to 15%  A tender, thickened, ulcerated or enlarging actinic keratosis is suspicious of SCC  Actinic keratoses may be prevented by strict sun protection  If already present, keratoses may improve with a very high sun protection factor (50+) broad-spectrum sunscreen applied at least daily to affected areas, year-round    We recommend that UPF-rated clothing and hats and sunglasses be worn whenever possible and that a sunscreen-moisturizer combination product such as Neutrogena Daily Defense be applied at least three times a day  We performed a thorough discussion of treatment options and specific risk/benefits/alternatives including but not limited to medical field treatment with medications such as the following:     Topical field area medications such as 5-fluorouracil or Aldara (specifically, the trouble with long-term compliance, blistering and local skin reaction versus the convenience of at-home therapy and that field therapy gets what is not yet seen)   Cryotherapy (specifically, local pain, scarring, dyspigmentation, blistering, possible superinfection, and treats only what we see versus directed treatment today)   Photodynamic therapy (specifically, local pain, scarring, dyspigmentation, blistering, possible superinfection, need to schedule for a later date, and time spent in the office versus field therapy that gets what is not yet seen)  PROCEDURE:  DESTRUCTION OF PRE-MALIGNANT LESIONS  After a thorough discussion of treatment options and risk/benefits/alternatives (including but not limited to local pain, scarring, dyspigmentation, blistering, and possible superinfection), verbal and written consent were obtained and the aforementioned lesions were treated on with cryotherapy using liquid nitrogen x 1 cycle for 5-10 seconds   TOTAL NUMBER of 4 pre-malignant lesions were treated today on the ANATOMIC LOCATION: left cheek, right temple, right cheek, nose  The patient tolerated the procedure well, and after-care instructions were provided        MELANOCYTIC NEVI ("Moles")    Physical Exam:   Anatomic Location Affected:  extemities   Morphological Description:  Scattered, 1-4mm round to ovoid, symmetrical-appearing, even bordered, skin colored to dark brown macules/papules, mostly in sun-exposed areas   Pertinent Positives:   Pertinent Negatives: Additional History of Present Condition:  Present for years    Assessment and Plan:  Based on a thorough discussion of this condition and the management approach to it (including a comprehensive discussion of the known risks, side effects and potential benefits of treatment), the patient (family) agrees to implement the following specific plan:   Reassured benign     Melanocytic Nevi  Melanocytic nevi ("moles") are caused by collections of the color producing skin cells, or melanocytes, in 1 area in the skin  They can range in color from pink to dark brown and be either raised or flat  Some moles are present at birth (I e , "congenital nevi"), while others come up later in life (i e , "acquired nevi")  Cedrick Number exposure also stimulates the body to make more moles, ie the more sun you get the more moles you'll grow  Clinically distinguishing a healthy mole from melanoma may be difficult  The "ABCDE's" of moles have been suggested as a means of helping to alert a person to a suspicious mole and the possible increased risk of melanoma  Asymmetry: Healthy moles tend to be symmetric, while melanomas are often asymmetric  Asymmetry means if you draw a line through the mole, the two halves do not match in color, size, shape, or surface texture Any mole that starts to demonstrate "asymmetry" should be examined promptly by a board certified dermatologist      Border: Healthy moles tend to have discrete, even borders  The border of a melanoma often blends into the normal skin and does not sharply delineate the mole from normal skin  Any mole that starts to demonstrate "uneven borders" should be examined promptly     Color: Healthy moles tend to be one color throughout  Melanomas tend to be made up of different colors ranging from dark black, blue, white, or red    Any mole that demonstrates a color change should be examined promptly    Diameter: Healthy moles tend to be smaller than 0 6 cm in size; an exception are "congenital nevi" that can be larger  Melanomas tend to grow and can often be greater than 0 6 cm (1/4 of an inch, or the size of a pencil eraser)  This is only a guideline, and many normal moles may be larger than 0 6 cm without being unhealthy  Any mole that starts to change in size (small to bigger or bigger to smaller) should be examined promptly    Evolving: Healthy moles tend to "stay the same "  Melanomas may often show signs of change or evolution such as a change in size, shape, color, or elevation  Any mole that starts to itch, bleed, crust, burn, hurt, or ulcerate or demonstrate a change or evolution should be examined promptly by a board certified dermatologist       What are atypical moles or dysplastic nevi? Dysplastic moles are moles that have some of the ABCDE  changes listed above but  are not cancerous  Sometimes a biopsy and microscopic examination are needed to determine the difference  They may indicate an increased risk of melanoma in that person, especially if there is a family history of melanoma  What is a Melanoma? The main concern when looking at a new or changing mole it to evaluate whether it may be a melanoma  The appearance of a "new mole" remains one of the most reliable methods for identifying a malignant melanoma  A melanoma is a type of skin cancer that can be deadly if it spreads throughout the body  The prognosis of a Melanoma depends on how deep it has penetrated in the skin  If caught early, they generally will not have had time to grow into the deeper layers of the skin and they cure rate is then very high  Once the melanoma grows deeper into the skin, the cure rate drops dramatically  Therefore, early detection and removal of a malignant melanoma results in a much better chance of complete cure       SEBORRHEIC DERMATITIS    Physical Exam:   Anatomic Location Affected:  face   Morphological Description:  Scattered red macules   Pertinent Positives:   Pertinent Negatives: Additional History of Present Condition:  Discovered upon skin exam    Assessment and Plan:  Based on a thorough discussion of this condition and the management approach to it (including a comprehensive discussion of the known risks, side effects and potential benefits of treatment), the patient (family) agrees to implement the following specific plan:   Ketoconazole cream apply to Forehead, cheeks twice daily for 1 month        Seborrheic Dermatitis   Seborrheic dermatitis is a common, chronic or relapsing form of eczema/dermatitis that mainly affects the sebaceous, gland-rich regions of the scalp, face, and trunk  There are infantile and adult forms of seborrhoeic dermatitis  It is sometimes associated with psoriasis and, in that clinical scenario, may be referred to as "sebo-psoriasis "  Seborrheic dermatitis is also known as "seborrheic eczema "  Dandruff (also called "pityriasis capitis") is an uninflamed form of seborrhoeic dermatitis  Dandruff presents as bran-like scaly patches scattered within hair-bearing areas of the scalp  In an infant, this condition may be referred to as "cradle cap "  The cause of seborrheic dermatitis is not completely understood  It is associated with proliferation of various species of the skin commensal Malassezia, in its yeast (non-pathogenic) form  Its metabolites (such as the fatty acids oleic acid, malssezin, and indole-3-carbaldehyde) may cause an inflammatory reaction  Differences in skin barrier lipid content and function may account for individual presentations  Infantile Seborrheic Dermatitis  Infantile seborrheic dermatitis affects babies under the age of 1 months and usually resolves by 1012 months of age  Infantile seborrheic dermatitis causes "cradle cap" (diffuse, greasy scaling on scalp)  The rash may spread to affect armpit and groin folds (a type of "napkin dermatitis")  There may be associated salmon-pink colored patches that may flake or peel  The rash in this case is usually not especially itchy, so the baby often appears undisturbed by the rash, even when more generalized  Adult Seborrheic Dermatitis  Adult seborrheic dermatitis tends to begin in late adolescence; prevalence is greatest in young adults and in the elderly  It is more common in males than in females  The following factors are sometimes associated with severe adult seborrheic dermatitis:   Oily skin   Familial tendency to seborrhoeic dermatitis or a family history of psoriasis   Immunosuppression: organ transplant recipient, human immunodeficiency virus (HIV) infection and patients with lymphoma   Neurological and psychiatric diseases: Parkinson disease, tardive dyskinesia, depression, epilepsy, facial nerve palsy, spinal cord injury and congenital disorders such as Down syndrome   Treatment for psoriasis with psoralen and ultraviolet A (PUVA) therapy   Lack of sleep   Stressful events  In adults, seborrheic dermatitis may typically affect the scalp, face (creases around the nose, behind ears, within eyebrows) and upper trunk  Typical clinical features include:   Winter flares, improving in summer following sun exposure   Minimal itch most of the time   Combination oily and dry mid-facial skin   Ill-defined localized scaly patches or diffuse scale in the scalp   Blepharitis; scaly red eyelid margins   Dothan-pink, thin, scaly, and ill-defined plaques in skin folds on both sides of the face   Petal or ring-shaped flaky patches on hair-line and on anterior chest   Rash in armpits, under the breasts, in the groin folds and genital creases   Superficial folliculitis (inflamed hair follicles) on cheeks and upper trunk    Seborrheic dermatitis is diagnosed by its clinical appearance and behavior  Skin biopsy may be helpful but is rarely necessary to make this diagnosis      Scribe Attestation I,:  Jeremy Linda am acting as a scribe while in the presence of the attending physician :       I,:  Karle Nissen, MD personally performed the services described in this documentation    as scribed in my presence :

## 2023-02-09 DIAGNOSIS — E78.5 HYPERLIPIDEMIA, UNSPECIFIED: ICD-10-CM

## 2023-02-09 DIAGNOSIS — R53.1 WEAKNESS: ICD-10-CM

## 2023-02-09 DIAGNOSIS — I51.7 CARDIOMEGALY: ICD-10-CM

## 2023-02-09 DIAGNOSIS — R07.89 OTHER CHEST PAIN: ICD-10-CM

## 2023-02-09 DIAGNOSIS — Z82.49 FAMILY HISTORY OF ISCHEMIC HEART DISEASE AND OTHER DISEASES OF THE CIRCULATORY SYSTEM: ICD-10-CM

## 2023-02-09 DIAGNOSIS — R00.1 BRADYCARDIA, UNSPECIFIED: ICD-10-CM

## 2023-02-09 DIAGNOSIS — R94.31 ABNORMAL ELECTROCARDIOGRAM (ECG) (EKG): ICD-10-CM

## 2023-02-20 ENCOUNTER — HOSPITAL ENCOUNTER (OUTPATIENT)
Dept: NON INVASIVE DIAGNOSTICS | Facility: HOSPITAL | Age: 63
Discharge: HOME | End: 2023-02-20

## 2023-02-20 VITALS
HEIGHT: 70 IN | HEART RATE: 50 BPM | DIASTOLIC BLOOD PRESSURE: 66 MMHG | BODY MASS INDEX: 26.92 KG/M2 | SYSTOLIC BLOOD PRESSURE: 110 MMHG | WEIGHT: 188 LBS

## 2023-02-20 DIAGNOSIS — Z82.49 FAMILY HISTORY OF ISCHEMIC HEART DISEASE AND OTHER DISEASES OF THE CIRCULATORY SYSTEM: ICD-10-CM

## 2023-02-20 DIAGNOSIS — E78.5 HYPERLIPIDEMIA, UNSPECIFIED: ICD-10-CM

## 2023-02-20 DIAGNOSIS — R94.31 ABNORMAL ELECTROCARDIOGRAM (ECG) (EKG): ICD-10-CM

## 2023-02-20 DIAGNOSIS — R00.1 BRADYCARDIA, UNSPECIFIED: ICD-10-CM

## 2023-02-20 DIAGNOSIS — R07.89 OTHER CHEST PAIN: ICD-10-CM

## 2023-02-20 DIAGNOSIS — R53.1 WEAKNESS: ICD-10-CM

## 2023-02-20 DIAGNOSIS — I51.7 CARDIOMEGALY: ICD-10-CM

## 2023-02-20 LAB
AORTIC ROOT: 3.4 CM
APICAL FOUR CHAMBER EJECTION FRACTION: 68 %
ASCENDING AORTA: 4 CM
AV LVOT MEAN GRADIENT: 3 MMHG
AV LVOT PEAK GRADIENT: 6 MMHG
AV REGURGITATION PRESSURE HALF TIME: 1079 MS
DOP CALC LVOT PEAK VEL VTI: 26.67 CM
DOP CALC LVOT PEAK VEL: 1.26 M/S
E WAVE DECELERATION TIME: 198 MS
FRACTIONAL SHORTENING: 38 % (ref 28–44)
INTERVENTRICULAR SEPTUM IN DIASTOLE (PARASTERNAL SHORT AXIS VIEW): 1 CM
INTERVENTRICULAR SEPTUM: 1 CM (ref 0.6–1.1)
LAAS-AP2: 20.1 CM2
LAAS-AP4: 20.7 CM2
LEFT ATRIUM AREA SYSTOLE SINGLE PLANE A4C: 20.4 CM2
LEFT ATRIUM SIZE: 4.3 CM
LEFT INTERNAL DIMENSION IN SYSTOLE: 3.2 CM (ref 2.1–4)
LEFT VENTRICULAR INTERNAL DIMENSION IN DIASTOLE: 5.2 CM (ref 3.5–6)
LEFT VENTRICULAR POSTERIOR WALL IN END DIASTOLE: 1 CM
LEFT VENTRICULAR STROKE VOLUME: 88 ML
LVSV (TEICH): 88 ML
MV E'TISSUE VEL-SEP: 10 CM/S
MV PEAK A VEL: 0.45 M/S
MV PEAK E VEL: 63 CM/S
MV STENOSIS PRESSURE HALF TIME: 57 MS
MV VALVE AREA P 1/2 METHOD: 3.86 CM2
PULMONARY REGURGITATION LATE DIASTOLIC VELOCITY: 0.01 M/S
RA PRESSURE ESTIMATED: 4 MMHG
RIGHT ATRIUM AREA SYSTOLE A4C: 23.2 CM2
RIGHT VENTRICLE ID DIMENSION: 3.9 CM
RV PSP: 22 MMHG
SL CV AV DECELERATION TIME RETROGRADE: 3721 MS
SL CV AV PEAK GRADIENT RETROGRADE: 83 MMHG
SL CV LEFT ATRIUM LENGTH A2C: 5.1 CM
SL CV LV EF: 60
SL CV PED ECHO LEFT VENTRICLE DIASTOLIC VOLUME (MOD BIPLANE) 2D: 129 ML
SL CV PED ECHO LEFT VENTRICLE SYSTOLIC VOLUME (MOD BIPLANE) 2D: 41 ML
TR MAX PG: 18 MMHG
TR PEAK VELOCITY: 2.1 M/S
TRICUSPID ANNULAR PLANE SYSTOLIC EXCURSION: 2.1 CM
TRICUSPID VALVE PEAK REGURGITATION VELOCITY: 2.12 M/S

## 2023-02-24 ENCOUNTER — HOSPITAL ENCOUNTER (OUTPATIENT)
Dept: NON INVASIVE DIAGNOSTICS | Facility: HOSPITAL | Age: 63
Discharge: HOME | End: 2023-02-24

## 2023-02-24 VITALS — WEIGHT: 188 LBS | HEIGHT: 70 IN | BODY MASS INDEX: 26.92 KG/M2

## 2023-02-24 DIAGNOSIS — R42 LIGHTHEADEDNESS: ICD-10-CM

## 2023-02-24 DIAGNOSIS — R07.9 CHEST PAIN, UNSPECIFIED TYPE: ICD-10-CM

## 2023-02-24 DIAGNOSIS — R94.31 NONSPECIFIC ABNORMAL ELECTROCARDIOGRAM (ECG) (EKG): ICD-10-CM

## 2023-02-24 LAB
AORTIC ROOT: 3.3 CM
ARRHY DURING EX: NORMAL
ASCENDING AORTA: 4 CM
CHEST PAIN STATEMENT: NORMAL
MAX DIASTOLIC BP: 84 MMHG
MAX HEART RATE: 150 BPM
MAX HR PERCENT: 94 %
MAX HR: 150 BPM
MAX PREDICTED HEART RATE: 159 BPM
MAX. SYSTOLIC BP: 218 MMHG
PROTOCOL NAME: NORMAL
RATE PRESSURE PRODUCT: NORMAL
SL CV LV EF: 60
SL CV STRESS RECOVERY BP: NORMAL MMHG
SL CV STRESS RECOVERY HR: 59 BPM
SL CV STRESS RECOVERY O2 SAT: 98 %
STRESS ANGINA INDEX: 0
STRESS BASELINE BP: NORMAL MMHG
STRESS BASELINE HR: 54 BPM
STRESS DUKE TREADMILL SCORE: 11
STRESS O2 SAT REST: 98 %
STRESS PEAK HR: 150 BPM
STRESS POST ESTIMATED WORKLOAD: 12.5 METS
STRESS POST EXERCISE DUR MIN: 10 MIN
STRESS POST EXERCISE DUR SEC: 30 SEC
STRESS POST O2 SAT PEAK: 98 %
STRESS POST PEAK BP: 218 MMHG
STRESS ST DEPRESSION: 0 MM
TARGET HR FORMULA: NORMAL
TEST INDICATION: NORMAL
TIME IN EXERCISE PHASE: NORMAL

## 2023-05-04 ENCOUNTER — PATIENT MESSAGE (OUTPATIENT)
Dept: UROLOGY | Facility: CLINIC | Age: 63
End: 2023-05-04

## 2023-05-04 DIAGNOSIS — Z80.42 FAMILY HISTORY OF PROSTATE CANCER: ICD-10-CM

## 2023-05-04 DIAGNOSIS — N26.1 ATROPHIC KIDNEY: Primary | ICD-10-CM

## 2023-05-04 NOTE — PATIENT COMMUNICATION
Telephone call to pt  He usually gets his labs drawn at the [facility] in [facility]       Printed and faxed lab slip to [facility]

## 2023-05-04 NOTE — TELEPHONE ENCOUNTER
From: Cora James  To: Kendra Go  Sent: 5/4/2023 1:44 PM EDT  Subject: Annual PSA test    Dr Kip George I misplaced the prescription you gave me for my annual PSA test  Can you post a new order in the portal? Thank you

## 2023-05-05 ENCOUNTER — OFFICE VISIT (OUTPATIENT)
Dept: UROLOGY | Facility: CLINIC | Age: 63
End: 2023-05-05

## 2023-05-05 VITALS
WEIGHT: 181 LBS | DIASTOLIC BLOOD PRESSURE: 80 MMHG | HEART RATE: 64 BPM | SYSTOLIC BLOOD PRESSURE: 140 MMHG | HEIGHT: 70 IN | BODY MASS INDEX: 25.91 KG/M2

## 2023-05-05 DIAGNOSIS — N41.0 PROSTATITIS, ACUTE: ICD-10-CM

## 2023-05-05 DIAGNOSIS — N26.1 ATROPHIC KIDNEY: ICD-10-CM

## 2023-05-05 DIAGNOSIS — R30.0 DYSURIA: Primary | ICD-10-CM

## 2023-05-05 LAB
POST-VOID RESIDUAL VOLUME, ML POC: 79 ML
SL AMB  POCT GLUCOSE, UA: NORMAL
SL AMB LEUKOCYTE ESTERASE,UA: NORMAL
SL AMB POCT BILIRUBIN,UA: NORMAL
SL AMB POCT BLOOD,UA: NORMAL
SL AMB POCT CLARITY,UA: CLEAR
SL AMB POCT COLOR,UA: YELLOW
SL AMB POCT KETONES,UA: NORMAL
SL AMB POCT NITRITE,UA: NORMAL
SL AMB POCT PH,UA: 7
SL AMB POCT SPECIFIC GRAVITY,UA: 1.02
SL AMB POCT URINE PROTEIN: NORMAL
SL AMB POCT UROBILINOGEN: 0.2

## 2023-05-05 RX ORDER — DOXYCYCLINE HYCLATE 100 MG/1
100 CAPSULE ORAL EVERY 12 HOURS SCHEDULED
Qty: 20 CAPSULE | Refills: 0 | Status: SHIPPED | OUTPATIENT
Start: 2023-05-05 | End: 2023-05-15

## 2023-05-05 RX ORDER — TAMSULOSIN HYDROCHLORIDE 0.4 MG/1
0.4 CAPSULE ORAL
Qty: 30 CAPSULE | Refills: 0 | Status: SHIPPED | OUTPATIENT
Start: 2023-05-05

## 2023-05-05 NOTE — PROGRESS NOTES
5/5/2023    No chief complaint on file  Assessment and Plan    59 y o  male manage by Dr Noah Sin    1  Dysuria  · Patient complaints of persistent difficulty with urination perineal pressure, and intermittent dysuria over the past 3 weeks  Urine testing has been negative  · Reports history of prostatitis in the past   · ROSS reveals a smooth soft prostate with mild tenderness, no palpable nodule or masses  Consistent with suspicion of acute prostatitis  · I will treat him for suspected acute prostatitis and I will start him on doxycycline 100 mg BID for 10 days due to his history of atrophic right kidney I would like to avoid Bactrim or Flouroquinolones  I will also start him on Flomax 0 4 mg daily  · I will see him back in 2-3 weeks for reassessment  Should his symptoms persist, I we will consider possible CT non-contrast to rule out possible obstructing stone  History of Present Illness  Melina Roth is a 59 y o  male here for evaluation of dysuria, perineal pressure, and difficulty with urination  Patient reached out via [employer] message reporting that he has been experiencing issues with urination over the past few weeks  He reported weak urinary stream, feeling like he had to go all the time, as well as discomfort in his rectum  His PCP did urine testing about 1 week ago and this was negative for infection  Symptoms have persisted  He reports history of prostatitis in the past and was treated by his prior Urologist Dr Nelson Caraballo  He denies any gross hematuria or flank pain  Patient had originally been seen by Dr Noah Sin in July 2021 for evaluation of right flank pain and an atrophic right kidney  He had a renal ultrasound on 4/13/2021 which showed marked right renal atrophy of the right kidney measuring 5 6 cm in maximal length  Dr Noah Sin felt his atrophic kidney was likely chronic 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    1 renal ultrasound reported he may have a "submillimeter stone in the right kidney was described as nonobstructing  Dr Pickard Drought recommended a CT scan without contrast to rule out any obstructive uropathy  This would be completed on 7/23/2021 and was negative for any obstructive uropathy  There were multiple punctate calcifications within the right kidney however no hydronephrosis  Review of Systems   Constitutional: Negative for chills and fever  HENT: Negative for congestion and sore throat  Respiratory: Negative for cough and shortness of breath  Cardiovascular: Negative for chest pain and leg swelling  Gastrointestinal: Negative for abdominal pain, constipation and diarrhea  Rectal pressure   Genitourinary: Positive for difficulty urinating, dysuria and urgency  Negative for frequency and hematuria  Musculoskeletal: Negative for back pain and gait problem  Skin: Negative for wound  Allergic/Immunologic: Negative for immunocompromised state  Neurological: Negative for dizziness, weakness and numbness  Hematological: Does not bruise/bleed easily  Vitals  Vitals:    05/05/23 1450   BP: 140/80   BP Location: Left arm   Patient Position: Sitting   Cuff Size: Adult   Pulse: 64   Weight: 82 1 kg (181 lb)   Height: 5' 10\" (1 778 m)       Physical Exam  Vitals reviewed  Constitutional:       General: He is not in acute distress  Appearance: Normal appearance  He is not ill-appearing or toxic-appearing  HENT:      Head: Normocephalic and atraumatic  Eyes:      General: No scleral icterus  Conjunctiva/sclera: Conjunctivae normal    Cardiovascular:      Rate and Rhythm: Normal rate  Pulmonary:      Effort: Pulmonary effort is normal  No respiratory distress  Abdominal:      Tenderness: There is no right CVA tenderness or left CVA tenderness  Hernia: No hernia is present  Genitourinary:     Comments: ROSS reveals a smooth soft prostate with mild tenderness, no palpable nodule or masses   " Musculoskeletal:      Cervical back: Normal range of motion  Right lower leg: No edema  Left lower leg: No edema  Skin:     General: Skin is warm and dry  Coloration: Skin is not jaundiced or pale  Neurological:      General: No focal deficit present  Mental Status: He is alert and oriented to person, place, and time  Mental status is at baseline  Gait: Gait normal    Psychiatric:         Mood and Affect: Mood normal          Behavior: Behavior normal          Thought Content: Thought content normal          Judgment: Judgment normal          Past History  Past Medical History:   Diagnosis Date    Known health problems: none      Social History     Socioeconomic History    Marital status: /Civil Union     Spouse name: None    Number of children: None    Years of education: None    Highest education level: None   Occupational History    None   Tobacco Use    Smoking status: Never    Smokeless tobacco: Never   Vaping Use    Vaping Use: Never used   Substance and Sexual Activity    Alcohol use:  Yes     Alcohol/week: 1 0 standard drink     Types: 1 Cans of beer per week    Drug use: Never    Sexual activity: None   Other Topics Concern    None   Social History Narrative    None     Social Determinants of Health     Financial Resource Strain: Not on file   Food Insecurity: Not on file   Transportation Needs: Not on file   Physical Activity: Not on file   Stress: Not on file   Social Connections: Not on file   Intimate Partner Violence: Not on file   Housing Stability: Not on file     Social History     Tobacco Use   Smoking Status Never   Smokeless Tobacco Never     Family History   Problem Relation Age of Onset    Hypertension Mother     Cancer Brother     Cancer Brother     Cancer Brother        The following portions of the patient's history were reviewed and updated as appropriate allergies, current medications, past medical history, past social history, past surgical history and problem list    Imaging:    Results  Recent Results (from the past 1 hour(s))   POCT urine dip    Collection Time: 05/05/23  2:48 PM   Result Value Ref Range    LEUKOCYTE ESTERASE,UA -     NITRITE,UA -     SL AMB POCT UROBILINOGEN 0 2     POCT URINE PROTEIN -      PH,UA 7 0     BLOOD,UA -     SPECIFIC GRAVITY,UA 1 020     KETONES,UA -     BILIRUBIN,UA -     GLUCOSE, UA -      COLOR,UA yellow     CLARITY,UA clear    POCT Measure PVR    Collection Time: 05/05/23  2:48 PM   Result Value Ref Range    POST-VOID RESIDUAL VOLUME, ML POC 79 mL   ]  No results found for: PSA  No results found for: GLUCOSE, CALCIUM, NA, K, CO2, CL, BUN, CREATININE  No results found for: WBC, HGB, HCT, MCV, PLT    Please Note:  Voice dictation software has been used to create this document  There may be inadvertent transcriptions errors       ANDREE Conde 05/05/23

## 2023-05-24 ENCOUNTER — OFFICE VISIT (OUTPATIENT)
Dept: UROLOGY | Facility: CLINIC | Age: 63
End: 2023-05-24

## 2023-05-24 VITALS
BODY MASS INDEX: 25.91 KG/M2 | OXYGEN SATURATION: 98 % | HEIGHT: 70 IN | DIASTOLIC BLOOD PRESSURE: 80 MMHG | WEIGHT: 181 LBS | HEART RATE: 52 BPM | SYSTOLIC BLOOD PRESSURE: 142 MMHG

## 2023-05-24 DIAGNOSIS — N41.0 PROSTATITIS, ACUTE: Primary | ICD-10-CM

## 2023-05-24 DIAGNOSIS — R30.0 DYSURIA: ICD-10-CM

## 2023-05-24 RX ORDER — DOXYCYCLINE HYCLATE 100 MG/1
100 CAPSULE ORAL EVERY 12 HOURS SCHEDULED
Qty: 28 CAPSULE | Refills: 0 | Status: SHIPPED | OUTPATIENT
Start: 2023-05-24 | End: 2023-06-07

## 2023-05-24 RX ORDER — TAMSULOSIN HYDROCHLORIDE 0.4 MG/1
0.4 CAPSULE ORAL
Qty: 30 CAPSULE | Refills: 0 | Status: SHIPPED | OUTPATIENT
Start: 2023-05-24

## 2023-05-24 NOTE — PROGRESS NOTES
5/24/2023    Chief Complaint   Patient presents with   • Difficulty Urinating       Assessment and Plan    59 y o  male     1  Acute prostatitis  · He did see noticeable improvement after treatment course of doxycycline 100 mg twice daily for 10 days  However, he continues to experience persistent mild symptoms  For this reason we will treat him for an additional 14 days of doxycycline 100 mg twice daily  He will call or send a message through [address] if his symptoms persist despite completion of second course of doxycycline  Should his symptoms persist I then recommend we schedule him for a cystoscopy with Dr Dara Manzo  If he has complete resolution of his symptoms then we will recheck a PSA in 1 to 2 weeks  · We will send urine today for culture  Subjective:    He presents today reporting that he has seen a noticeable improvement in his urinary stream as well as urgency and perineal discomfort  He still experiences some mild symptoms though despite completion of doxycycline for 10 days  His perineal pain/pressure is rated at about a 3 out of 10 as compared to his initial office visit when he reported the pain as a 10 out of 10  He has seen an improvement in his urinary stream as well since the use of Flomax  History of Present Illness  Ralph Thao is a 59 y o  male here for follow-up evaluation of suspected prostatitis  Patient was last seen by me on 5/5/2023 after he had reached out via [employer] message reporting that he had been experiencing issues with urination over the past few weeks  He reported a weak urinary stream, feeling like he had to go all the time, as well as a discomfort in his rectum  His PCP had done urine testing about 1 week prior to his office visit with me which was negative for infection  His symptoms had persisted  He did report a history of prostatitis in the past and was treated by his prior urologist Dr Noretta Rubinstein    He denies any gross hematuria or flank "pain  Based on his symptom complaints I treated him for suspected acute prostatitis and started him on doxycycline 100 mg twice daily for 10 days due to his history of atrophic right kidney I wish to avoid Bactrim or fluoroquinolones  He was also started on Flomax 0 4 mg daily  He presents today for follow-up  Prostate cancer screening: Last PSA 0 68 as of 3/17/2022 previously 1 54 on 5/24/2021 and 0 7 on 7/21/2020  He does have a strong family history of prostate cancer in 2 brothers  Due to suspicion for acute prostatitis I recommended waiting for repeat PSA testing until completion of antibiotics  Review of Systems   Constitutional: Negative for chills and fever  HENT: Negative for congestion and sore throat  Respiratory: Negative for cough and shortness of breath  Cardiovascular: Negative for chest pain and leg swelling  Gastrointestinal: Negative for abdominal pain, constipation and diarrhea  Genitourinary: Positive for frequency and urgency  Negative for difficulty urinating, dysuria and hematuria  Perineal pain/pressure   Musculoskeletal: Negative for back pain and gait problem  Skin: Negative for wound  Allergic/Immunologic: Negative for immunocompromised state  Neurological: Negative for dizziness, weakness and numbness  Hematological: Does not bruise/bleed easily  Vitals  Vitals:    05/24/23 0941   BP: 142/80   Pulse: (!) 52   SpO2: 98%   Weight: 82 1 kg (181 lb)   Height: 5' 10\" (1 778 m)       Physical Exam  Vitals reviewed  Constitutional:       General: He is not in acute distress  Appearance: Normal appearance  He is not ill-appearing or toxic-appearing  HENT:      Head: Normocephalic and atraumatic  Eyes:      General: No scleral icterus  Conjunctiva/sclera: Conjunctivae normal    Cardiovascular:      Rate and Rhythm: Normal rate  Pulmonary:      Effort: Pulmonary effort is normal  No respiratory distress     Abdominal:      " Tenderness: There is no right CVA tenderness or left CVA tenderness  Hernia: No hernia is present  Musculoskeletal:      Cervical back: Normal range of motion  Right lower leg: No edema  Left lower leg: No edema  Skin:     General: Skin is warm and dry  Coloration: Skin is not jaundiced or pale  Neurological:      General: No focal deficit present  Mental Status: He is alert and oriented to person, place, and time  Mental status is at baseline  Gait: Gait normal    Psychiatric:         Mood and Affect: Mood normal          Behavior: Behavior normal          Thought Content: Thought content normal          Judgment: Judgment normal          Past History  Past Medical History:   Diagnosis Date   • Known health problems: none      Social History     Socioeconomic History   • Marital status: /Civil Union     Spouse name: None   • Number of children: None   • Years of education: None   • Highest education level: None   Occupational History   • None   Tobacco Use   • Smoking status: Never   • Smokeless tobacco: Never   Vaping Use   • Vaping Use: Never used   Substance and Sexual Activity   • Alcohol use:  Yes     Alcohol/week: 1 0 standard drink of alcohol     Types: 1 Cans of beer per week   • Drug use: Never   • Sexual activity: None   Other Topics Concern   • None   Social History Narrative   • None     Social Determinants of Health     Financial Resource Strain: Not on file   Food Insecurity: Not on file   Transportation Needs: Not on file   Physical Activity: Not on file   Stress: Not on file   Social Connections: Not on file   Intimate Partner Violence: Not on file   Housing Stability: Not on file     Social History     Tobacco Use   Smoking Status Never   Smokeless Tobacco Never     Family History   Problem Relation Age of Onset   • Hypertension Mother    • Cancer Brother    • Cancer Brother    • Cancer Brother        The following portions of the patient's history were "reviewed and updated as appropriate allergies, current medications, past medical history, past social history, past surgical history and problem list    Imaging:    Results  No results found for this or any previous visit (from the past 1 hour(s))  ]  No results found for: \"PSA\"  No results found for: \"BUN\", \"CALCIUM\", \"CL\", \"CO2\", \"CREATININE\", \"GLUCOSE\", \"K\", \"NA\"  No results found for: \"HCT\", \"HGB\", \"MCV\", \"PLT\", \"WBC\"    Please Note:  Voice dictation software has been used to create this document  There may be inadvertent transcriptions errors       ANDREE Saravia 05/24/23   "

## 2023-05-25 LAB — BACTERIA UR CULT: NORMAL

## 2023-05-26 ENCOUNTER — TELEPHONE (OUTPATIENT)
Dept: UROLOGY | Facility: CLINIC | Age: 63
End: 2023-05-26

## 2023-05-26 NOTE — TELEPHONE ENCOUNTER
Called patient to let him know that his urine culture came back negative for infection and that he should continue to take the doxycycline as prescribed  Pt confirmed understanding            ----- Message from Jeanie LARIOS  80  sent at 5/26/2023 10:32 AM EDT -----  Urine culture is negative  Continue doxycycline as prescribed

## 2023-06-12 ENCOUNTER — HOSPITAL ENCOUNTER (OUTPATIENT)
Dept: RADIOLOGY | Facility: CLINIC | Age: 63
Discharge: HOME | End: 2023-06-12
Payer: COMMERCIAL

## 2023-06-12 VITALS
BODY MASS INDEX: 27.23 KG/M2 | HEART RATE: 62 BPM | DIASTOLIC BLOOD PRESSURE: 80 MMHG | TEMPERATURE: 97.7 F | WEIGHT: 190.2 LBS | SYSTOLIC BLOOD PRESSURE: 120 MMHG | HEIGHT: 70 IN

## 2023-06-12 DIAGNOSIS — M25.561 CHRONIC PAIN OF RIGHT KNEE: ICD-10-CM

## 2023-06-12 DIAGNOSIS — M25.561 CHRONIC PAIN OF RIGHT KNEE: Primary | ICD-10-CM

## 2023-06-12 DIAGNOSIS — G89.29 CHRONIC PAIN OF RIGHT KNEE: Primary | ICD-10-CM

## 2023-06-12 DIAGNOSIS — M17.11 PRIMARY OSTEOARTHRITIS OF RIGHT KNEE: ICD-10-CM

## 2023-06-12 DIAGNOSIS — G89.29 CHRONIC PAIN OF RIGHT KNEE: ICD-10-CM

## 2023-06-12 PROCEDURE — 73564 X-RAY EXAM KNEE 4 OR MORE: CPT

## 2023-06-12 PROCEDURE — 99213 OFFICE O/P EST LOW 20 MIN: CPT | Performed by: ORTHOPAEDIC SURGERY

## 2023-06-12 NOTE — PROGRESS NOTES
ASSESSMENT/PLAN:    Diagnoses and all orders for this visit:    Chronic pain of right knee  -     XR knee 4+ vw right injury; Future    Primary osteoarthritis of right knee        Plan: Treatment was discussed  He did not want to consider injection and prefers to avoid any medications orally  He will consider physical therapy but is not sure that he would want to pursue a formal course of physical therapy  I will plan to see him back as needed  If he does decide to pursue a formal course of physical therapy I would recommend follow-up 4 to 6 weeks after he begins physical therapy  If symptoms do not seem to spontaneously improve over another 2 to 3 weeks, and he is able to resume his more normal exercise program, reevaluation would be warranted  Return if symptoms worsen or fail to improve       _____________________________________________________  CHIEF COMPLAINT:  Chief Complaint   Patient presents with   • Right Knee - Pain         SUBJECTIVE:  Anjel Valderrama is a 59y o  year old male who presents for evaluation of his right knee  He states he has been exercising for reasonably long period of time but tried to increase his exercise program about 2 to 3 months ago and noted aggravation of right knee pain  He noted that running as well as lifting weights seem to aggravate his knee pain  He has avoided these activities but even walking aggravated his knee pain when he was walking prolonged distances  He denies any injury  He did note an episode of swelling after walking 3 miles on Memorial Day weekend with approximately a 1 week duration of time before the swelling seemed to subside  His pain is localized primarily to the medial knee  He has had no treatment or diagnostic studies  He denies left knee symptoms  He denies paresthesias  He does note morning stiffness but states that stiffness is common and present all over      PAST MEDICAL HISTORY:  Past Medical History:   Diagnosis Date   • Known health problems: none        PAST SURGICAL HISTORY:  Past Surgical History:   Procedure Laterality Date   • HERNIA REPAIR         FAMILY HISTORY:  Family History   Problem Relation Age of Onset   • Hypertension Mother    • Cancer Brother         Prostate   • Cancer Brother         Prostate   • Cancer Brother         Colon       SOCIAL HISTORY:  Social History     Tobacco Use   • Smoking status: Never   • Smokeless tobacco: Never   Vaping Use   • Vaping Use: Never used   Substance Use Topics   • Alcohol use: Yes     Alcohol/week: 1 0 standard drink of alcohol     Types: 1 Cans of beer per week   • Drug use: Never       MEDICATIONS:    Current Outpatient Medications:   •  Multiple Vitamins-Iron TABS, Take by mouth daily, Disp: , Rfl:   •  Probiotic Product (Misc Intestinal Emily Regulat) CAPS, Take 1 capsule by mouth Three times a day, Disp: , Rfl:   •  ketoconazole (NIZORAL) 2 % cream, Apply topically twice a day to forehead, eyebrows, nasal creases, and behind ears  (Patient not taking: Reported on 5/5/2023), Disp: 60 g, Rfl: 2  •  tamsulosin (FLOMAX) 0 4 mg, Take 1 capsule (0 4 mg total) by mouth daily with dinner (Patient not taking: Reported on 6/12/2023), Disp: 30 capsule, Rfl: 0    ALLERGIES:  No Known Allergies    Review of systems:   Constitutional: Negative for fatigue, fever or loss of apetite  HENT: Negative  Respiratory: Negative for shortness of breath, dyspnea  Cardiovascular: Negative for chest pain/tightness  Gastrointestinal: Negative for abdominal pain, N/V  Endocrine: Negative for cold/heat intolerance, unexplained weight loss/gain  Genitourinary: Negative for flank pain, dysuria, hematuria  Musculoskeletal: Positive as in the HPI  Skin: Negative for rash  Neurological: Negative  Psychiatric/Behavioral: Negative for agitation    _____________________________________________________  PHYSICAL EXAMINATION:    Blood pressure 120/80, pulse 62, temperature 97 7 °F (36 5 °C), "temperature source Temporal, height 5' 10\" (1 778 m), weight 86 3 kg (190 lb 3 2 oz)  General: well developed and well nourished, alert, oriented times 3 and appears comfortable  Psychiatric: Normal  HEENT: Benign  Cardiovascular: Regular    Pulmonary: No wheezing or stridor  Abdomen: Soft, Nontender  Skin: No masses, erythema, lacerations, fluctation, ulcerations  Neurovascular: Motor and sensory exams are intact and pulses palpable  MUSCULOSKELETAL EXAMINATION:    The left knee exam demonstrates full extension  The skin is warm and dry  There is a small effusion noted on exam   There is no visible deformity  He has flexion to 140 degrees with complaints of medial sided pain during endrange flexion  He had some tenderness along the posteromedial joint line  The more anterior joint line and the lateral joint line were nontender  Collateral and cruciate ligaments are stable although he does complain of some medial pain with valgus stress  There is mild swelling noted over the pes bursa  Katina's test elicited no complaints  Apley's compression and distraction test are negative  He denies complaints with patellofemoral compression during quadricep contraction  The remainder of the lower extremity examination bilaterally is benign  _____________________________________________________  STUDIES REVIEWED:  X-rays of his knee obtained today demonstrate narrowing of the medial joint space and small osteophytes at the medial and patellofemoral compartments  There is slight subchondral sclerosis of the medial tibial plateau and flattening of the medial femoral condyle        Mayda Land"

## 2023-07-06 DIAGNOSIS — R30.0 DYSURIA: ICD-10-CM

## 2023-07-06 RX ORDER — TAMSULOSIN HYDROCHLORIDE 0.4 MG/1
CAPSULE ORAL
Qty: 90 CAPSULE | Refills: 1 | Status: SHIPPED | OUTPATIENT
Start: 2023-07-06

## 2023-09-21 ENCOUNTER — TELEPHONE (OUTPATIENT)
Dept: UROLOGY | Facility: CLINIC | Age: 63
End: 2023-09-21

## 2023-09-21 DIAGNOSIS — Z80.42 FAMILY HISTORY OF PROSTATE CANCER: Primary | ICD-10-CM

## 2023-09-21 NOTE — TELEPHONE ENCOUNTER
----- Message from Elisa Parsons sent at 9/21/2023 10:37 AM EDT -----  Regarding: Annual PSA test  Contact: [phone]  Siena Gomez, I haven't had my annual PSA test this year. Can the office fax a new PSA script to the [facility] testing lab on [address] in [address] [place] [address]? Their fax# is [phone]. Thanks.

## 2024-04-27 ENCOUNTER — PATIENT MESSAGE (OUTPATIENT)
Dept: UROLOGY | Facility: CLINIC | Age: 64
End: 2024-04-27